# Patient Record
(demographics unavailable — no encounter records)

---

## 2025-03-10 NOTE — HISTORY OF PRESENT ILLNESS
[FreeTextEntry1] : Ms. ALEX MORALES, 40 year old female, ...smoker, w/ hx of ....., who presented   Now s/p FB, tracheostomy, EGD peg placement on 1/23/25.

## 2025-03-17 NOTE — REASON FOR VISIT
[Follow-Up: _____] : a [unfilled] follow-up visit [de-identified] : FB, Right VATS, robotic assisted, RUL Wedge x2, decortication [de-identified] : 1/2/25 [de-identified] : S/p Tracheostomy and peg tube placement on 1/23/25.

## 2025-03-17 NOTE — ASSESSMENT
[FreeTextEntry1] : Ms. ALEX MORALES, 40 year old female, ...smoker, w/ hx of Chiari malformation, hydrocephalus,  shunt placed at 2 years of age (Dr Perez), revised x2, converted to a ventriculopleural shunt in 2020 iso perf appendicitis. She presented to [facility] (12/27/24-2/27/25) with SOB x 2 weeks. Workup imaging revealed right pleural effusion with complete Right lung atelectasis and near-complete LLL atelectasis. In ED s/p thoracentesis: 2.5 L drained; R PTC placed with 1.5 L drained. On 12/28/24 neuro externalized VPleural shunt; pt intubated and now new FEAL. Patient with airleak noted with pneumothorax noted on xray.  Now s/p FB, Right VATS, robotic assisted, RUL Wedge x2, decortication on 1/2/25.  *Path of right pleural peel reveals Fibrinous material with mesothelial cell hyperplasia and chronic inflammation.  *Path of RUL wedge reveals Lung tissue with marked pleural reaction and fibrinous pleuritis. Chronic inflammation is also present. Foci of intra-alveolar macrophage accumulation. Airways show mild remodeling and mild increase in fibrosis. Pulmonary vasculature is unremarkable without evidence of vasculitis or thrombosis.  *Path of RUL wedge #2: Fragments of lung parenchyma.   Postoperatively still with air leak. Requiring endobronchial valve placement with interventional pulmonary.  Post operative course with respiratory failure, weak cough, frequent bronchoscopy for secretion mobilization. She was then s/p Tracheostomy and peg tube placement on 1/23/25.  On 2/25/25 patient s/p bronch and endobronchial valve removed and trach decannulation.   CXR today---stable post-op changes, no ptx  Presents today for post-op visit. Overall, she reports to be feeling well. Denies any chest pain, shortness of breath, cough, hemoptysis, fever, or chills. Additionally, she has been tolerating regular diet, denies any difficulty swallowing.   Surgical incision healing well, no sign of infection noted.   I have reviewed the patient's medical records and diagnostic images at time of this office consultation and have made the following recommendation: 1. CXR reviewed with patient and her mom, stable post-op findings. Path negative for malignancy, c/w with chronic inflammation. Discussed no further thoracic surgery intervention indicated at this time. Continue follow up with PCP.  2. Additionally, she is tolerating regular diet without difficulty. Peg tube removed in office today without any complication, she tolerated well. Instructed patient to keep site clean and dry. May seen some drainage for few days, can apply gauze over stoma.  3. RTC as needed, she is agreeable.   Recommendations reviewed with patient during this office visit, and all questions answered; Patient instructed on the importance of follow up and verbalizes understanding.    I, Dr. HUERTA, JOID YE, personally performed the evaluation and management (E/M) services for this established patient. That E/M includes conducting the examination, assessing all new/exacerbated conditions, and establishing a new plan of care. Today, my ACP, Haresh Johnson NP, was here to observe my evaluation and management services for this new problem/exacerbated condition to be followed going forward.

## 2025-03-17 NOTE — ASSESSMENT
[FreeTextEntry1] : Ms. ALEX MORALES, 40 year old female, ...smoker, w/ hx of Chiari malformation, hydrocephalus,  shunt placed at 2 years of age (Dr Perez), revised x2, converted to a ventriculopleural shunt in 2020 iso perf appendicitis. She presented to [facility] (12/27/24-2/27/25) with SOB x 2 weeks. Workup imaging revealed right pleural effusion with complete Right lung atelectasis and near-complete LLL atelectasis. In ED s/p thoracentesis: 2.5 L drained; R PTC placed with 1.5 L drained. On 12/28/24 neuro externalized VPleural shunt; pt intubated and now new FEAL. Patient with airleak noted with pneumothorax noted on xray.  Now s/p FB, Right VATS, robotic assisted, RUL Wedge x2, decortication on 1/2/25.  *Path of right pleural peel reveals Fibrinous material with mesothelial cell hyperplasia and chronic inflammation.  *Path of RUL wedge reveals Lung tissue with marked pleural reaction and fibrinous pleuritis. Chronic inflammation is also present. Foci of intra-alveolar macrophage accumulation. Airways show mild remodeling and mild increase in fibrosis. Pulmonary vasculature is unremarkable without evidence of vasculitis or thrombosis.  *Path of RUL wedge #2: Fragments of lung parenchyma.   Postoperatively still with air leak. Requiring endobronchial valve placement with interventional pulmonary.  Post operative course with respiratory failure, weak cough, frequent bronchoscopy for secretion mobilization. She was then s/p Tracheostomy and peg tube placement on 1/23/25.  On 2/25/25 patient s/p bronch and endobronchial valve removed and trach decannulation.   CXR today---stable post-op changes, no ptx  Presents today for post-op visit. Overall, she reports to be feeling well. Denies any chest pain, shortness of breath, cough, hemoptysis, fever, or chills. Additionally, she has been tolerating regular diet, denies any difficulty swallowing.   Surgical incision healing well, no sign of infection noted.   I have reviewed the patient's medical records and diagnostic images at time of this office consultation and have made the following recommendation: 1. CXR reviewed with patient and her mom, stable post-op findings. Path negative for malignancy, c/w with chronic inflammation. Discussed no further thoracic surgery intervention indicated at this time. Continue follow up with PCP.  2. Additionally, she is tolerating regular diet without difficulty. Peg tube removed in office today without any complication, she tolerated well. Instructed patient to keep site clean and dry. May seen some drainage for few days, can apply gauze over stoma.  3. RTC as needed, she is agreeable.   Recommendations reviewed with patient during this office visit, and all questions answered; Patient instructed on the importance of follow up and verbalizes understanding.    I, Dr. HUERTA, JODI YE, personally performed the evaluation and management (E/M) services for this established patient. That E/M includes conducting the examination, assessing all new/exacerbated conditions, and establishing a new plan of care. Today, my ACP, Haresh Johnson NP, was here to observe my evaluation and management services for this new problem/exacerbated condition to be followed going forward.

## 2025-03-17 NOTE — ASSESSMENT
[FreeTextEntry1] : Ms. ALEX MORALES, 40 year old female, ...smoker, w/ hx of Chiari malformation, hydrocephalus,  shunt placed at 2 years of age (Dr Perez), revised x2, converted to a ventriculopleural shunt in 2020 iso perf appendicitis. She presented to [facility] (12/27/24-2/27/25) with SOB x 2 weeks. Workup imaging revealed right pleural effusion with complete Right lung atelectasis and near-complete LLL atelectasis. In ED s/p thoracentesis: 2.5 L drained; R PTC placed with 1.5 L drained. On 12/28/24 neuro externalized VPleural shunt; pt intubated and now new FEAL. Patient with airleak noted with pneumothorax noted on xray.  Now s/p FB, Right VATS, robotic assisted, RUL Wedge x2, decortication on 1/2/25.  *Path of right pleural peel reveals Fibrinous material with mesothelial cell hyperplasia and chronic inflammation.  *Path of RUL wedge reveals Lung tissue with marked pleural reaction and fibrinous pleuritis. Chronic inflammation is also present. Foci of intra-alveolar macrophage accumulation. Airways show mild remodeling and mild increase in fibrosis. Pulmonary vasculature is unremarkable without evidence of vasculitis or thrombosis.  *Path of RUL wedge #2: Fragments of lung parenchyma.   Postoperatively still with air leak. Requiring endobronchial valve placement with interventional pulmonary.  Post operative course with respiratory failure, weak cough, frequent bronchoscopy for secretion mobilization. She was then s/p Tracheostomy and peg tube placement on 1/23/25.  On 2/25/25 patient s/p bronch and endobronchial valve removed and trach decannulation.   CXR today---stable post-op changes, no ptx  Presents today for post-op visit. Overall, she reports to be feeling well. Denies any chest pain, shortness of breath, cough, hemoptysis, fever, or chills. Additionally, she has been tolerating regular diet, denies any difficulty swallowing.   Surgical incision healing well, no sign of infection noted.   I have reviewed the patient's medical records and diagnostic images at time of this office consultation and have made the following recommendation: 1. CXR reviewed with patient and her mom, stable post-op findings. Path negative for malignancy, c/w with chronic inflammation. Discussed no further thoracic surgery intervention indicated at this time. Continue follow up with PCP.  2. Additionally, she is tolerating regular diet without difficulty. Peg tube removed in office today without any complication, she tolerated well. Instructed patient to keep site clean and dry. May seen some drainage for few days, can apply gauze over stoma.  3. RTC as needed, she is agreeable.   Recommendations reviewed with patient during this office visit, and all questions answered; Patient instructed on the importance of follow up and verbalizes understanding.    I, Dr. HUERTA, JODI YE, personally performed the evaluation and management (E/M) services for this established patient. That E/M includes conducting the examination, assessing all new/exacerbated conditions, and establishing a new plan of care. Today, my ACP, Haresh Johnson NP, was here to observe my evaluation and management services for this new problem/exacerbated condition to be followed going forward.

## 2025-03-17 NOTE — REASON FOR VISIT
[Follow-Up: _____] : a [unfilled] follow-up visit [de-identified] : FB, Right VATS, robotic assisted, RUL Wedge x2, decortication [de-identified] : 1/2/25 [de-identified] : S/p Tracheostomy and peg tube placement on 1/23/25.

## 2025-03-17 NOTE — REASON FOR VISIT
[Follow-Up: _____] : a [unfilled] follow-up visit [de-identified] : FB, Right VATS, robotic assisted, RUL Wedge x2, decortication [de-identified] : 1/2/25 [de-identified] : S/p Tracheostomy and peg tube placement on 1/23/25.